# Patient Record
Sex: FEMALE | Race: WHITE | NOT HISPANIC OR LATINO | Employment: FULL TIME | URBAN - METROPOLITAN AREA
[De-identification: names, ages, dates, MRNs, and addresses within clinical notes are randomized per-mention and may not be internally consistent; named-entity substitution may affect disease eponyms.]

---

## 2024-07-12 ENCOUNTER — OFFICE VISIT (OUTPATIENT)
Dept: FAMILY MEDICINE | Facility: CLINIC | Age: 29
End: 2024-07-12
Payer: COMMERCIAL

## 2024-07-12 VITALS
SYSTOLIC BLOOD PRESSURE: 104 MMHG | BODY MASS INDEX: 23.23 KG/M2 | DIASTOLIC BLOOD PRESSURE: 72 MMHG | HEIGHT: 67 IN | WEIGHT: 148 LBS | RESPIRATION RATE: 18 BRPM | HEART RATE: 73 BPM | TEMPERATURE: 99 F | OXYGEN SATURATION: 99 %

## 2024-07-12 DIAGNOSIS — R30.0 DYSURIA: Primary | ICD-10-CM

## 2024-07-12 LAB
ALBUMIN UR-MCNC: NEGATIVE MG/DL
APPEARANCE UR: CLEAR
BILIRUB UR QL STRIP: NEGATIVE
COLOR UR AUTO: YELLOW
GLUCOSE UR STRIP-MCNC: NEGATIVE MG/DL
HGB UR QL STRIP: NEGATIVE
KETONES UR STRIP-MCNC: NEGATIVE MG/DL
LEUKOCYTE ESTERASE UR QL STRIP: NEGATIVE
NITRATE UR QL: NEGATIVE
PH UR STRIP: 7.5 [PH] (ref 5–8)
SP GR UR STRIP: 1.01 (ref 1–1.03)
UROBILINOGEN UR STRIP-ACNC: 0.2 E.U./DL

## 2024-07-12 PROCEDURE — 81003 URINALYSIS AUTO W/O SCOPE: CPT | Performed by: FAMILY MEDICINE

## 2024-07-12 PROCEDURE — 99203 OFFICE O/P NEW LOW 30 MIN: CPT | Performed by: FAMILY MEDICINE

## 2024-07-12 RX ORDER — PHENAZOPYRIDINE HYDROCHLORIDE 100 MG/1
100 TABLET, FILM COATED ORAL 3 TIMES DAILY PRN
Qty: 30 TABLET | Refills: 0 | Status: SHIPPED | OUTPATIENT
Start: 2024-07-12

## 2024-07-12 SDOH — HEALTH STABILITY: PHYSICAL HEALTH: ON AVERAGE, HOW MANY DAYS PER WEEK DO YOU ENGAGE IN MODERATE TO STRENUOUS EXERCISE (LIKE A BRISK WALK)?: 3 DAYS

## 2024-07-12 ASSESSMENT — SOCIAL DETERMINANTS OF HEALTH (SDOH): HOW OFTEN DO YOU GET TOGETHER WITH FRIENDS OR RELATIVES?: ONCE A WEEK

## 2024-07-12 ASSESSMENT — PAIN SCALES - GENERAL: PAINLEVEL: NO PAIN (0)

## 2024-07-12 NOTE — PATIENT INSTRUCTIONS
Since your breast pain is local- please keep an eye on it. Keep an eye out for: skin changes, new lumps or bumps (even nearby)

## 2024-07-12 NOTE — PROGRESS NOTES
Assessment & Plan     Dysuria  Acute, uncomplicated. Not positive for urinary tract infection despite symptoms. Recommend referral to urology, consider interstitial cystitis.   - UA reflex to Microscopic - lab collect  - UA reflex to Microscopic - lab collect  - phenazopyridine (PYRIDIUM) 100 MG tablet  Dispense: 30 tablet; Refill: 0  - Adult Urology  Referral    Patient has been advised of split billing requirements and indicates understanding: Yes        Counseling  Appropriate preventive services were addressed with this patient via screening, questionnaire, or discussion as appropriate for fall prevention, nutrition, physical activity, Tobacco-use cessation, weight loss and cognition.  Checklist reviewing preventive services available has been given to the patient.      See Patient Instructions    Subjective Rylee is a 28 year old, presenting for the following health issues:  Establish Care and UTI        7/12/2024    12:54 PM   Additional Questions   Roomed by MAURISIO Mcintyre   Accompanied by ZAIRA SILVA       Genitourinary - Female  Onset/Duration: 2 months -  Description:   Painful urination (Dysuria): YES - and several minutes after            Frequency: YES- and some urgency.   Blood in urine (Hematuria): No  Delay in urine (Hesitency): No  Intensity: moderate  Progression of Symptoms:  worsening  Accompanying Signs & Symptoms:  Fever/chills: YES  Flank pain: YES  Nausea and vomiting: No  Vaginal symptoms: none  Abdominal/Pelvic Pain: YES  History:   History of frequent UTI s: YES- once every couple years  History of kidney stones: No  Sexually Active: YES  Possibility of pregnancy: No  Precipitating or alleviating factors: dehydration makes it worse.   Therapies tried and outcome: Increase fluid intake    Initially thought it was a yeast infection, treated with monistat and it had mild relief. But it returned. Patient has right sided back pain but unable to discern if related to UTI. No abnormal  "vaginal discharge. She is having some pelvic brim.       Review of Systems  Constitutional, HEENT, cardiovascular, pulmonary, gi and gu systems are negative, except as otherwise noted.      Objective    /72 (BP Location: Right arm, Patient Position: Sitting, Cuff Size: Adult Regular)   Pulse 73   Temp 99  F (37.2  C) (Oral)   Resp 18   Ht 1.709 m (5' 7.3\")   Wt 67.1 kg (148 lb)   LMP 06/21/2024 (Approximate)   SpO2 99%   BMI 22.97 kg/m    Body mass index is 22.97 kg/m .  Physical Exam   GENERAL: alert and no distress  EYES: Eyes grossly normal to inspection, PERRL and conjunctivae and sclerae normal  NECK: no adenopathy, no asymmetry, masses, or scars  MS: no gross musculoskeletal defects noted, no edema  SKIN: no suspicious lesions or rashes  PSYCH: mentation appears normal, affect normal/bright    Results for orders placed or performed in visit on 07/12/24   UA reflex to Microscopic - lab collect     Status: Normal   Result Value Ref Range    Color Urine Yellow Colorless, Straw, Light Yellow, Yellow    Appearance Urine Clear Clear    Glucose Urine Negative Negative mg/dL    Bilirubin Urine Negative Negative    Ketones Urine Negative Negative mg/dL    Specific Gravity Urine 1.015 1.005 - 1.030    Blood Urine Negative Negative    pH Urine 7.5 5.0 - 8.0    Protein Albumin Urine Negative Negative mg/dL    Urobilinogen Urine 0.2 0.2, 1.0 E.U./dL    Nitrite Urine Negative Negative    Leukocyte Esterase Urine Negative Negative    Narrative    Microscopic not indicated           Signed Electronically by: AYAN IBARRA DO    "

## 2024-07-12 NOTE — PROGRESS NOTES
Preventive Care Visit  [facility]  AYANANTOLIN IBARRA DO, Family Medicine  Jul 12, 2024  {Provider  Link to [facility] :399839}    {PROVIDER CHARTING PREFERENCE:041814}    Subjective Rylee is a 28 year old, presenting for the following:  Physical        7/12/2024    12:54 PM   Additional Questions   Roomed by MAURISIO Mcintyre   Accompanied by ZAIRA        Health Care Directive  Patient does not have a Health Care Directive or Living Will: Discussed advance care planning with patient; however, patient declined at this time.    HPI  ***  {MA/LPN/RN Pre-Provider Visit Orders- hCG/UA/Strep (Optional):930793}  {SUPERLIST (Optional):319169}  {additonal problems for provider to add (Optional):854452}      7/12/2024   General Health   How would you rate your overall physical health? Good   Feel stress (tense, anxious, or unable to sleep) To some extent      (!) STRESS CONCERN      7/12/2024   Nutrition   Three or more servings of calcium each day? (!) NO   Diet: Breakfast skipped   How many servings of fruit and vegetables per day? (!) 2-3   How many sweetened beverages each day? 0-1            7/12/2024   Exercise   Days per week of moderate/strenous exercise 3 days            7/12/2024   Social Factors   Frequency of gathering with friends or relatives Once a week   Worry food won't last until get money to buy more No   Food not last or not have enough money for food? No   Do you have housing? (Housing is defined as stable permanent housing and does not include staying ouside in a car, in a tent, in an abandoned building, in an overnight shelter, or couch-surfing.) Yes   Are you worried about losing your housing? No   Lack of transportation? No   Unable to get utilities (heat,electricity)? No            7/12/2024   Dental   Dentist two times every year? Yes            7/12/2024   TB Screening   Were you born outside of the [place]? No            Today's PHQ-2 Score:       7/12/2024    12:55 PM   PHQ-2  ( 1999 [employer])   Q1: Little interest or pleasure in doing things 0   Q2: Feeling down, depressed or hopeless 0   PHQ-2 Score 0   Q1: Little interest or pleasure in doing things Not at all   Q2: Feeling down, depressed or hopeless Not at all   PHQ-2 Score 0           7/12/2024   Substance Use   Alcohol more than 3/day or more than 7/wk Yes   How often do you have a drink containing alcohol 2 to 3 times a week   How many alcohol drinks on typical day 3 or 4   How often do you have 5+ drinks at one occasion Monthly   Audit 2/3 Score 3   How often not able to stop drinking once started Less than monthly   How often failed to do what normally expected Never   How often needed first drink in am after a heavy drinking session Never   How often feeling of guilt or remorse after drinking Less than monthly   How often unable to remember what happened the night before Less than monthly   Have you or someone else been injured because of your drinking No   Has anyone been concerned or suggested you cut down on drinking No   TOTAL SCORE - AUDIT 9   Do you use any other substances recreationally? (!) ALCOHOL        Social History     Tobacco Use    Smoking status: Never    Smokeless tobacco: Current    Tobacco comments:     Nicotine Lozenges.    Vaping Use    Vaping status: Never Used     {Provider  If there are gaps in the social history shown above, please follow the link to update and then refresh the note Link to Social and Substance History :911262}     {Mammogram Decision Support (Optional):060835}          7/12/2024   One time HIV Screening   Previous HIV test? No          7/12/2024   STI Screening   New sexual partner(s) since last STI/HIV test? (!) YES ***        History of abnormal Pap smear: { :760912}             7/12/2024   Contraception/Family Planning   Questions about contraception or family planning No        {Provider  REQUIRED FOR AWV Use the storyboard to review patient history, after sections have been  "marked as reviewed, refresh note to capture documentation:107078}   Reviewed and updated as needed this visit by Provider                    {HISTORY OPTIONS (Optional):263005}    {ROS Picklists (Optional):027763}     Objective    Exam  /72 (BP Location: Right arm, Patient Position: Sitting, Cuff Size: Adult Regular)   Pulse 73   Temp 99  F (37.2  C) (Oral)   Resp 18   Ht 1.709 m (5' 7.3\")   Wt 67.1 kg (148 lb)   LMP 06/21/2024 (Approximate)   SpO2 99%   BMI 22.97 kg/m     Estimated body mass index is 22.97 kg/m  as calculated from the following:    Height as of this encounter: 1.709 m (5' 7.3\").    Weight as of this encounter: 67.1 kg (148 lb).    Physical Exam  {Exam Choices (Optional):707643}        Signed Electronically by: AYAN IABRRA DO  {Email feedback regarding this note to [email or web address]   :701819}  "

## 2024-07-12 NOTE — RESULT ENCOUNTER NOTE
Results discussed directly with patient while patient was present. Any further details documented in the note.   AYAN IBARRA, DO

## 2024-07-29 ENCOUNTER — OFFICE VISIT (OUTPATIENT)
Dept: UROLOGY | Facility: CLINIC | Age: 29
End: 2024-07-29
Attending: FAMILY MEDICINE
Payer: COMMERCIAL

## 2024-07-29 VITALS
WEIGHT: 148 LBS | HEIGHT: 67 IN | OXYGEN SATURATION: 97 % | TEMPERATURE: 97.8 F | BODY MASS INDEX: 23.23 KG/M2 | DIASTOLIC BLOOD PRESSURE: 75 MMHG | SYSTOLIC BLOOD PRESSURE: 110 MMHG

## 2024-07-29 DIAGNOSIS — R30.0 DYSURIA: ICD-10-CM

## 2024-07-29 LAB
ALBUMIN UR-MCNC: NEGATIVE MG/DL
APPEARANCE UR: CLEAR
BILIRUB UR QL STRIP: NEGATIVE
COLOR UR AUTO: YELLOW
GLUCOSE UR STRIP-MCNC: NEGATIVE MG/DL
HGB UR QL STRIP: NEGATIVE
KETONES UR STRIP-MCNC: NEGATIVE MG/DL
LEUKOCYTE ESTERASE UR QL STRIP: NEGATIVE
NITRATE UR QL: NEGATIVE
PH UR STRIP: 6 [PH] (ref 5–7)
RBC #/AREA URNS AUTO: NORMAL /HPF
SP GR UR STRIP: 1.02 (ref 1–1.03)
UROBILINOGEN UR STRIP-ACNC: 0.2 E.U./DL
WBC #/AREA URNS AUTO: NORMAL /HPF

## 2024-07-29 PROCEDURE — 87563 M. GENITALIUM AMP PROBE: CPT | Mod: 90 | Performed by: STUDENT IN AN ORGANIZED HEALTH CARE EDUCATION/TRAINING PROGRAM

## 2024-07-29 PROCEDURE — 99000 SPECIMEN HANDLING OFFICE-LAB: CPT | Performed by: STUDENT IN AN ORGANIZED HEALTH CARE EDUCATION/TRAINING PROGRAM

## 2024-07-29 PROCEDURE — 87798 DETECT AGENT NOS DNA AMP: CPT | Mod: 90 | Performed by: STUDENT IN AN ORGANIZED HEALTH CARE EDUCATION/TRAINING PROGRAM

## 2024-07-29 PROCEDURE — 99203 OFFICE O/P NEW LOW 30 MIN: CPT | Performed by: STUDENT IN AN ORGANIZED HEALTH CARE EDUCATION/TRAINING PROGRAM

## 2024-07-29 PROCEDURE — 81001 URINALYSIS AUTO W/SCOPE: CPT | Performed by: STUDENT IN AN ORGANIZED HEALTH CARE EDUCATION/TRAINING PROGRAM

## 2024-07-29 ASSESSMENT — PAIN SCALES - GENERAL: PAINLEVEL: NO PAIN (0)

## 2024-07-29 NOTE — PATIENT INSTRUCTIONS
"BLADDER IRRITANTS    Below is a list of foods that can irritate the bladder.     Caffeinated soft drinks  Coffee.  Tea.  Chocolate.  Acidic juices and fruits such as cranberry, lemon, orange, pineapple and other citrus as well as tart apple or cherry   Alcoholic beverages  Carbonated drinks, especially phoenix  Aspartame/Nutrasweet.  Spicy foods  Tomato products    Substitutions that you can make in your daily diet:  - Herbals teas that don't contain caffeine or a large amount of citrus  - Ovaltine instead of chocolate drinks  - Less acidic fruits such as blueberry, banana, sweet (rather than tart) apples, pears, papaya.      Either:  - Consider limiting or avoiding these foods then monitoring the impact on your urinary symptoms   - Or consider an \"elimination diet.\"  Start by eliminating all these foods for 2 weeks.  Assess for progress.  If you are feeling better, slowly add foods back - 1 new food every 3-5 days and assess for worsening.  This way you can pinpoint exactly which foods worsen your bladder symptoms.       "

## 2024-07-29 NOTE — PROGRESS NOTES
"        Chief Complaint:   LUTS         History of Present Illness:   Rylee P Caruso is a 28 year old female with a history of chronic cluster headaches, tobacco use disorder, GERD, major depressive disorder, and PATRICIA who presents for evaluation of LUTS.    The patient reports a several month history of intermittent dysuria. She notices the dysuria more when she is at work. She has a labor intensive job. She reports a slightly stronger urge, but this is not bothersome. She denies sensation of incomplete bladder emptying, nocturia, and gross hematuria.     She reports some constipation more recently, but can fluctuate between diarrhea and constipation.    She denies a history of urologic procedures. She had congenital VUR.     She a large mug of coffee, water, and occasional alcohol. She also drinks sparkling water.           Past Medical History:   No past medical history on file.         Past Surgical History:   No past surgical history on file.         Medications     Current Outpatient Medications   Medication Sig Dispense Refill    phenazopyridine (PYRIDIUM) 100 MG tablet Take 1 tablet (100 mg) by mouth 3 times daily as needed for urinary tract discomfort (Patient not taking: Reported on 7/29/2024) 30 tablet 0     No current facility-administered medications for this visit.            Allergies:   Patient has no known allergies.         Review of Systems:  From intake questionnaire   Negative 14 system review except as noted on HPI, nurse's note.         Physical Exam:   Patient is a 28 year old  female   Vitals: Blood pressure 110/75, temperature 97.8  F (36.6  C), temperature source Tympanic, height 1.709 m (5' 7.3\"), weight 67.1 kg (148 lb), last menstrual period 06/21/2024, SpO2 97%.  General Appearance Adult: Alert, no acute distress, oriented.  Lungs: Non-labored breathing.  Heart: No obvious jugular venous distension present.  Neuro: Alert, oriented, speech and mentation normal        Labs and Pathology:  "   I personally reviewed all applicable laboratory data and went over findings with patient  Significant for:    UA RESULTS:   Recent Labs   Lab Test 07/12/24  1335   SG 1.015   URINEPH 7.5   NITRITE Negative              Assessment and Plan:     Assessment: 28 year old female seen in evaluation for intermittent dysuria. Her symptoms are worse at work. She has a labor intensive job.     UAs have been unremarkable. Renal US in February 2024 was unremarkable as well.     We discussed possible causes for her symptoms including infection, consumption of bladder irritants, pelvic floor dysfunction, and less likely kidney stones. We will repeat UA today and test for mycoplasma/ureaplasma infection as well. We discussed pelvic floor PT. Her symptoms are not particularly bothersome at this time, so we will hold off on a referral for now.     Plan:  UA and mycoplasma/ureaplasma testing today.   Reduce bladder irritants.   Follow up with urology as needed.     DIANELYS BASS PA-C  Department of Urology

## 2024-07-31 LAB
M GENITALIUM DNA SPEC QL NAA+PROBE: NOT DETECTED
M HOMINIS DNA SPEC QL NAA+PROBE: NOT DETECTED
U PARVUM DNA SPEC QL NAA+PROBE: NOT DETECTED
U UREALYTICUM DNA SPEC QL NAA+PROBE: NOT DETECTED

## 2024-10-06 ENCOUNTER — HEALTH MAINTENANCE LETTER (OUTPATIENT)
Age: 29
End: 2024-10-06

## 2024-12-30 ENCOUNTER — VIRTUAL VISIT (OUTPATIENT)
Dept: FAMILY MEDICINE | Facility: CLINIC | Age: 29
End: 2024-12-30
Payer: COMMERCIAL

## 2024-12-30 ENCOUNTER — ALLIED HEALTH/NURSE VISIT (OUTPATIENT)
Dept: FAMILY MEDICINE | Facility: CLINIC | Age: 29
End: 2024-12-30
Payer: COMMERCIAL

## 2024-12-30 VITALS — OXYGEN SATURATION: 99 % | DIASTOLIC BLOOD PRESSURE: 70 MMHG | SYSTOLIC BLOOD PRESSURE: 110 MMHG | HEART RATE: 106 BPM

## 2024-12-30 DIAGNOSIS — G44.009 CLUSTER HEADACHE, NOT INTRACTABLE, UNSPECIFIED CHRONICITY PATTERN: Primary | ICD-10-CM

## 2024-12-30 DIAGNOSIS — I10 ENCOUNTER FOR FOLLOW-UP FOR HYPERTENSION: Primary | ICD-10-CM

## 2024-12-30 PROBLEM — F32.1 CURRENT MODERATE EPISODE OF MAJOR DEPRESSIVE DISORDER WITHOUT PRIOR EPISODE (H): Status: ACTIVE | Noted: 2023-07-10

## 2024-12-30 PROBLEM — G43.109 MIGRAINE WITH AURA: Status: ACTIVE | Noted: 2020-09-11

## 2024-12-30 PROBLEM — K58.9 IRRITABLE BOWEL SYNDROME: Status: ACTIVE | Noted: 2023-07-10

## 2024-12-30 PROBLEM — G44.029 CHRONIC CLUSTER HEADACHE, NOT INTRACTABLE: Status: ACTIVE | Noted: 2024-01-17

## 2024-12-30 PROBLEM — Z80.0 FAMILY HISTORY OF COLON CANCER: Status: ACTIVE | Noted: 2023-07-10

## 2024-12-30 PROBLEM — F41.1 GAD (GENERALIZED ANXIETY DISORDER): Status: ACTIVE | Noted: 2023-07-10

## 2024-12-30 PROBLEM — K21.9 GASTROESOPHAGEAL REFLUX DISEASE: Status: ACTIVE | Noted: 2023-07-10

## 2024-12-30 PROCEDURE — 99213 OFFICE O/P EST LOW 20 MIN: CPT | Mod: 95 | Performed by: NURSE PRACTITIONER

## 2024-12-30 PROCEDURE — 99207 PR NO CHARGE NURSE ONLY: CPT

## 2024-12-30 RX ORDER — CEFUROXIME AXETIL 250 MG/1
6 TABLET ORAL
Qty: 2 ML | Refills: 1 | Status: SHIPPED | OUTPATIENT
Start: 2024-12-30

## 2024-12-30 NOTE — PROGRESS NOTES
Rylee is a 29 year old who is being evaluated via a billable video visit.    How would you like to obtain your AVS? [employer]  If the video visit is dropped, the invitation should be resent by: Text to cell phone: [phone]  Will anyone else be joining your video visit? No      If patient has telephone visit, have they been educated on video visit as preferred visit method and offered to change to video visit? NOT APPLICABLE        Instructions Relayed to Patient by Virtual Roomer:         Patient Confirmed they will join visit via: Text Link to Cell Phone  Reminded patient to ensure they were logged on to virtual visit by arrival time listed.   Asked if patient has flexibility to initiate visit sooner than arrival time: patient stated yes, documented in appointment notes availability to initiate visit earlier than arrival time     If pediatric virtual visit, ensured pediatric patient along with parent/guardian will be present for video visit.     Patient offered the website [email or web address] and/or phone number to [employer] Help line: [phone]   Assessment & Plan     Cluster headache, not intractable, unspecified chronicity pattern  I have asked for records from her Neurologist. She reports having had an unremarkable brain MRI  10/12/21 through [facility] neurological clinic. She did have slight ethmoid paranasal sinus inflammatory changes. (Report available through [employer]/jil. Once records are reviewed, will consider writing referral for O2.  She was also advised to follow back with [facility] for ongoing headache management.              Regular exercise  See Patient Instructions    Subjective   Rylee is a 29 year old, presenting for the following health issues:  Mirgrane  and Medication check       12/30/2024     1:02 PM   Additional Questions   Roomed by Ranjana   Accompanied by self         12/30/2024     1:02 PM   Patient Reported Additional Medications   Patient reports taking the  following new medications No     History of Present Illness       Reason for visit:  Migraine, medication check   She is taking medications regularly.       Migraine /cluster headaches  Rylee reports history of cluster headaches diagnosed about 2 years ago with Neurology (thinks she saw Dr. Luis Enrique Santillan Neurology) after having had headaches off and on since age 15.  She has tried multiple po and IM tryptans without improvement- the only one that helped some is Imitrex and she requests a refill of this today.  It does cause her to feel hung over in the am, however. She has been prescribed supplemental O2 for when she gets a cluster headache (right sided around her eye) but never started it and requests order for it today.  We have no records and I have asked that she have her Neurologist sen me her records. She reports having cluster headache at least annually and symptoms will occur 3-7 times a week and last up to a couple of months at a time.  She has also been on Lithuim and Topamax neither of which helped with the headaches.  PMH significant for history of major depression and PATRICIA as well.    She works irregular [employer] as a  and also as a window , reports going to medication between 1- an dsleeping until 0700. 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     Since your last clinic visit, how have your headaches changed?  No change  How often are you getting headaches or migraines? Annually, reports history of cluster headaches   Are you able to do normal daily activities when you have a migraine? No- has missed work due to headaches  Are you taking rescue/relief medications? (Select all that apply) sumatriptan (Imitrex)  How helpful is your rescue/relief medication?  I get some relief  Are you taking any medications to prevent migraines? (Select all that apply)  No  In the past 4 weeks, how often have  you gone to urgent care or the emergency room because of your headaches?  0  How many servings of fruits and vegetables do you eat daily?  2-3  On average, how many sweetened beverages do you drink each day (Examples: soda, juice, sweet tea, etc.  Do NOT count diet or artificially sweetened beverages)?   0  How many days per week do you exercise enough to make your heart beat faster? 4  How many minutes a day do you exercise enough to make your heart beat faster? 20 - 29  How many days per week do you miss taking your medication? 0    Depression- she is not now taking anything for depression or anxiety, does not want to address this today, has appt next form for physical and we'll address this then.    Review of Systems  Constitutional, HEENT, cardiovascular, pulmonary, gi and gu systems are negative, except as otherwise noted.      Objective           Vitals:  No vitals were obtained today due to virtual visit.    Physical Exam   GENERAL: alert and no distress  EYES: Eyes grossly normal to inspection.  No discharge or erythema, or obvious scleral/conjunctival abnormalities.  HENT: Normal cephalic/atraumatic.  External ears, nose and mouth without ulcers or lesions.  No nasal drainage visible.  NECK: No asymmetry, visible masses or scars  RESP: No audible wheeze, cough, or visible cyanosis.    SKIN: Visible skin clear. No significant rash, abnormal pigmentation or lesions.  NEURO: Cranial nerves grossly intact.  Mentation and speech appropriate for age.  PSYCH: Appropriate affect, tone, and pace of words        Video-Visit Details    Type of service:  Video Visit   Originating Location (pt. Location): Home    Distant Location (provider location):  On-site  Platform used for Video Visit: Ankit  Signed Electronically by: SERINA Schwartz CNP

## 2024-12-30 NOTE — PROGRESS NOTES
Rylee P Caruso is a 29 year old patient who comes in today for a Blood Pressure check.  Initial BP:  /70 (BP Location: Right arm, Patient Position: Sitting, Cuff Size: Adult Regular)   Pulse 106   LMP 12/09/2024 (Approximate)   SpO2 99%      106  Disposition: follow-up as previously indicated by provider and results routed to provider

## 2024-12-31 ENCOUNTER — MYC MEDICAL ADVICE (OUTPATIENT)
Dept: FAMILY MEDICINE | Facility: CLINIC | Age: 29
End: 2024-12-31
Payer: COMMERCIAL

## 2025-01-06 ENCOUNTER — PATIENT OUTREACH (OUTPATIENT)
Dept: CARE COORDINATION | Facility: CLINIC | Age: 30
End: 2025-01-06
Payer: COMMERCIAL

## 2025-01-08 NOTE — TELEPHONE ENCOUNTER
Received records, visit note from 10/6/2021 and MRI Brain W/O/mail from 10/11/2021 from [facility]. Placed in Halima Cohn's box.  Annie Maddox [facility]   Primary Care

## 2025-02-03 ENCOUNTER — OFFICE VISIT (OUTPATIENT)
Dept: FAMILY MEDICINE | Facility: CLINIC | Age: 30
End: 2025-02-03
Payer: COMMERCIAL

## 2025-02-03 VITALS
WEIGHT: 150.8 LBS | SYSTOLIC BLOOD PRESSURE: 115 MMHG | HEIGHT: 67 IN | HEART RATE: 86 BPM | TEMPERATURE: 98.4 F | OXYGEN SATURATION: 100 % | DIASTOLIC BLOOD PRESSURE: 77 MMHG | RESPIRATION RATE: 16 BRPM | BODY MASS INDEX: 23.67 KG/M2

## 2025-02-03 DIAGNOSIS — Z00.00 ROUTINE HISTORY AND PHYSICAL EXAMINATION OF ADULT: Primary | ICD-10-CM

## 2025-02-03 DIAGNOSIS — F41.1 GAD (GENERALIZED ANXIETY DISORDER): ICD-10-CM

## 2025-02-03 DIAGNOSIS — Z28.20 VACCINE REFUSED BY PATIENT: ICD-10-CM

## 2025-02-03 DIAGNOSIS — N64.4 BREAST PAIN, RIGHT: ICD-10-CM

## 2025-02-03 DIAGNOSIS — F32.1 CURRENT MODERATE EPISODE OF MAJOR DEPRESSIVE DISORDER WITHOUT PRIOR EPISODE (H): ICD-10-CM

## 2025-02-03 DIAGNOSIS — G44.029 CHRONIC CLUSTER HEADACHE, NOT INTRACTABLE: ICD-10-CM

## 2025-02-03 PROBLEM — G44.019 EPISODIC CLUSTER HEADACHE, NOT INTRACTABLE: Status: ACTIVE | Noted: 2023-11-07

## 2025-02-03 PROCEDURE — 99214 OFFICE O/P EST MOD 30 MIN: CPT | Performed by: NURSE PRACTITIONER

## 2025-02-03 PROCEDURE — 96127 BRIEF EMOTIONAL/BEHAV ASSMT: CPT | Performed by: NURSE PRACTITIONER

## 2025-02-03 PROCEDURE — 99395 PREV VISIT EST AGE 18-39: CPT | Mod: 25 | Performed by: NURSE PRACTITIONER

## 2025-02-03 PROCEDURE — 3074F SYST BP LT 130 MM HG: CPT | Performed by: NURSE PRACTITIONER

## 2025-02-03 PROCEDURE — G2211 COMPLEX E/M VISIT ADD ON: HCPCS | Performed by: NURSE PRACTITIONER

## 2025-02-03 PROCEDURE — 3078F DIAST BP <80 MM HG: CPT | Performed by: NURSE PRACTITIONER

## 2025-02-03 SDOH — HEALTH STABILITY: PHYSICAL HEALTH: ON AVERAGE, HOW MANY DAYS PER WEEK DO YOU ENGAGE IN MODERATE TO STRENUOUS EXERCISE (LIKE A BRISK WALK)?: 1 DAY

## 2025-02-03 ASSESSMENT — SOCIAL DETERMINANTS OF HEALTH (SDOH): HOW OFTEN DO YOU GET TOGETHER WITH FRIENDS OR RELATIVES?: ONCE A WEEK

## 2025-02-03 ASSESSMENT — PATIENT HEALTH QUESTIONNAIRE - PHQ9: SUM OF ALL RESPONSES TO PHQ QUESTIONS 1-9: 3

## 2025-02-03 NOTE — PROGRESS NOTES
Preventive Care Visit  [facility]  SERINA Schwartz CNP, Family Medicine  Feb 3, 2025      Assessment & Plan     Routine history and physical examination of adult  Appropriate preventive services were addressed with this patient via screening, questionnaire, or discussion as appropriate for fall prevention, nutrition, physical activity, tobacco-use cessation, social engagement, weight loss and cognition.  Checklist reviewing preventive services available has been given to the patient.      - BASIC METABOLIC PANEL  - REVIEW OF HEALTH MAINTENANCE PROTOCOL ORDERS  - PRIMARY CARE FOLLOW-UP SCHEDULING    PATRICIA (generalized anxiety disorder)  Checking TSH, not on selective serotonin reuptake inhibitor and declines medication for it for now.  - TSH with free T4 reflex    Current moderate episode of major depressive disorder without prior episode (H)  Is open to counseling to help with PATRICIA and Depression, referral placed.  - Adult Mental Health  Referral    Breast pain, right  Getting US.mammo as indicated  - US Breast Right Limited 1-3 Quadrants  - MA Screen Right w/Abiel    Chronic cluster headache, not intractable  STable at present, continue with prn Imitraex, keep headache log and bring to next visit, follow back 6 monnths, sooner if not improved     Vaccine refused by patient  She declined COVID, and Flu vaccines          Counseling  Appropriate preventive services were addressed with this patient via screening, questionnaire, or discussion as appropriate for fall prevention, nutrition, physical activity, Tobacco-use cessation, social engagement, weight loss and cognition.  Checklist reviewing preventive services available has been given to the patient.      Work on weight loss  Regular exercise  See Patient Instructions    Subjective Rylee is a 29 year old, presenting for the following:  Physical        2/3/2025     3:13 PM   Additional Questions   Roomed by elias  "  Accompanied by self         2/3/2025     3:13 PM   Patient Reported Additional Medications   Patient reports taking the following new medications no          HPI    Has lead exposure from her work, most recent lead level was 6 per patient,followed by her Occupational medicine provider through her work.    Right breast pain just above right nipple    Depression and Anxiety   How are you doing with your depression since your last visit? No change  How are you doing with your anxiety since your last visit?  No change  Are you having other symptoms that might be associated with depression or anxiety? Yes:  Feels as if she has \"a lot of outward energy and then will crash and burn for 2-3 days \" for the last 5 years occurring every 1-2 months but now has symptoms a couple of times a year.  When she crashes she admits to feeling agorophobic and does not leave the house for 2-3 days. MGF had bipolar.  Have you had a significant life event? No   Do you have any concerns with your use of alcohol or other drugs? No    Social History     Tobacco Use    Smoking status: Never    Smokeless tobacco: Current    Tobacco comments:     Nicotine Lozenges.    Vaping Use    Vaping status: Never Used         2/3/2025     3:15 PM   PHQ   PHQ-9 Total Score 3   Q9: Thoughts of better off dead/self-harm past 2 weeks Not at all          No data to display                  Migraine   Since your last clinic visit, how have your headaches changed?  Improved  How often are you getting headaches or migraines? A couple of time a a year   Are you able to do normal daily activities when you have a migraine? No  Are you taking rescue/relief medications? (Select all that apply) ibuprofen (Advil, Motrin), Excedrin, and sumatriptan (Imitrex)  How helpful is your rescue/relief medication?  I get some relief  Are you taking any medications to prevent migraines? (Select all that apply)  No  In the past 4 weeks, how often have you gone to urgent care or the " emergency room because of your headaches?  0    Health Care Directive  Patient does not have a Health Care Directive: Discussed advance care planning with patient; information given to patient to review.      2/3/2025   General Health   How would you rate your overall physical health? Good   Feel stress (tense, anxious, or unable to sleep) To some extent   (!) STRESS CONCERN      2/3/2025   Nutrition   Three or more servings of calcium each day? (!) NO   Diet: Regular (no restrictions)   How many servings of fruit and vegetables per day? (!) 2-3   How many sweetened beverages each day? 0-1         2/3/2025   Exercise   Days per week of moderate/strenous exercise 1 day   (!) EXERCISE CONCERN      2/3/2025   Social Factors   Frequency of gathering with friends or relatives Once a week   Worry food won't last until get money to buy more No   Food not last or not have enough money for food? No   Do you have housing? (Housing is defined as stable permanent housing and does not include staying ouside in a car, in a tent, in an abandoned building, in an overnight shelter, or couch-surfing.) Yes   Are you worried about losing your housing? No   Lack of transportation? No   Unable to get utilities (heat,electricity)? No         2/3/2025   Dental   Dentist two times every year? Yes         7/12/2024   TB Screening   Were you born outside of the [place]? No         Today's PHQ-2 Score:       2/3/2025     3:00 PM   PHQ-2 ( 1999 [employer])   Q1: Little interest or pleasure in doing things 0   Q2: Feeling down, depressed or hopeless 0   PHQ-2 Score 0    Q1: Little interest or pleasure in doing things Not at all   Q2: Feeling down, depressed or hopeless Not at all   PHQ-2 Score 0       Patient-reported           2/3/2025   Substance Use   Alcohol more than 3/day or more than 7/wk Yes   How often do you have a drink containing alcohol 2 to 3 times a week   How many alcohol drinks on typical day 3 or 4   How often do you have 5+ drinks at  "one occasion Monthly   Audit 2/3 Score 3   How often not able to stop drinking once started Less than monthly   How often failed to do what normally expected Less than monthly   How often needed first drink in am after a heavy drinking session Never   How often feeling of guilt or remorse after drinking Less than monthly   How often unable to remember what happened the night before Never   Have you or someone else been injured because of your drinking No   Has anyone been concerned or suggested you cut down on drinking No   TOTAL SCORE - AUDIT 9   Do you use any other substances recreationally? No     Social History     Tobacco Use    Smoking status: Never    Smokeless tobacco: Current    Tobacco comments:     Nicotine Lozenges.    Vaping Use    Vaping status: Never Used          Mammogram Screening - Patient under 40 years of age: Routine Mammogram Screening not recommended.         2/3/2025   STI Screening   New sexual partner(s) since last STI/HIV test? No     History of abnormal Pap smear: No - age 21-29 PAP every 3 years recommended             2/3/2025   Contraception/Family Planning   Questions about contraception or family planning No        Reviewed and updated as needed this visit by Provider                    No past medical history on file.  No past surgical history on file.      Review of Systems  Constitutional, HEENT, cardiovascular, pulmonary, gi and gu systems are negative, except as otherwise noted.     Objective    Exam  /77 (BP Location: Left arm, Patient Position: Sitting, Cuff Size: Adult Regular)   Pulse 86   Temp 98.4  F (36.9  C) (Oral)   Resp 16   Ht 1.702 m (5' 7\")   Wt 68.4 kg (150 lb 12.8 oz)   LMP 01/15/2025 (Approximate)   SpO2 100%   BMI 23.62 kg/m     Estimated body mass index is 23.62 kg/m  as calculated from the following:    Height as of this encounter: 1.702 m (5' 7\").    Weight as of this encounter: 68.4 kg (150 lb 12.8 oz).    Physical Exam  GENERAL: alert and no " distress  EYES: Eyes grossly normal to inspection, PERRL and conjunctivae and sclerae normal  HENT: ear canals and TM's normal, nose and mouth without ulcers or lesions  NECK: no adenopathy, no asymmetry, masses, or scars  RESP: lungs clear to auscultation - no rales, rhonchi or wheezes  CV: regular rate and rhythm, normal S1 S2, no S3 or S4, no murmur, click or rub, no peripheral edema  ABDOMEN: soft, nontender, no hepatosplenomegaly, no masses and bowel sounds normal  MS: no gross musculoskeletal defects noted, no edema  SKIN: no suspicious lesions or rashes  NEURO: Normal strength and tone, mentation intact and speech normal  BACK: no CVA tenderness, no paralumbar tenderness  PSYCH: mentation appears normal, affect normal/bright  LYMPH: normal ant/post cervical, supraclavicular nodes  inguinal: no adenopathy        Signed Electronically by: SERINA Schwartz CNP

## 2025-02-03 NOTE — PATIENT INSTRUCTIONS
At [facility], we strive to deliver an exceptional experience to you, every time we see you. If you receive a survey, please let us know what we are doing well and/or what we could improve upon, as we do value your feedback.  If you have MyChart, you can expect to receive results automatically within 24 hours of their completion.  Your provider will send a note interpreting your results as well.   If you do not have MyChart, you should receive your results in about a week by mail.    Your care team:                            Family Medicine Internal Medicine   MD Butch Monge, MD Tracy Gaona, MD Tony Rodriguez, MD Alice Narayanan, PAAncaC    Clark Lepe, MD Pediatrics   Jania Palomo, MD Shira Tracey, MD Halima Cohn, APRN CNP Kimberly Scott APRN CNP   Ladan Brooke, MD Gissel Guardado, MD Lupe Lozano, CNP     Cayetano Puente, CNP Same-Day Provider (No follow-up visits)   SERINA Mariano, DNP Dona Myers, PA-C   SERINA Welch, FNP, BC SONU McconnellC     Clinic hours: Monday - Thursday 7 am-6 pm; Fridays 7 am-5 pm.   Urgent care: Monday - Friday 10 am- 8 pm; Saturday and Sunday 9 am-5 pm.    Clinic: [phone]       [facility]: Monday - Thursday 8 am - 7 pm; Friday 8 am - 6 pm  [facility]: [phone] '  Patient Education   Preventive Care Advice   This is general advice given by our system to help you stay healthy. However, your care team may have specific advice just for you. Please talk to your care team about your preventive care needs.  Nutrition  Eat 5 or more servings of fruits and vegetables each day.  Try wheat bread, brown rice and whole grain pasta (instead of white bread, rice, and pasta).  Get enough calcium and vitamin D. Check the label on foods and aim for 100% of the RDA (recommended daily allowance).  Lifestyle  Exercise at least 150  minutes each week  (30 minutes a day, 5 days a week).  Do muscle strengthening activities 2 days a week. These help control your weight and prevent disease.  No smoking.  Wear sunscreen to prevent skin cancer.  Have a dental exam and cleaning every 6 months.  Yearly exams  See your health care team every year to talk about:  Any changes in your health.  Any medicines your care team has prescribed.  Preventive care, family planning, and ways to prevent chronic diseases.  Shots (vaccines)   HPV shots (up to age 26), if you've never had them before.  Hepatitis B shots (up to age 59), if you've never had them before.  COVID-19 shot: Get this shot when it's due.  Flu shot: Get a flu shot every year.  Tetanus shot: Get a tetanus shot every 10 years.  Pneumococcal, hepatitis A, and RSV shots: Ask your care team if you need these based on your risk.  Shingles shot (for age 50 and up)  General health tests  Diabetes screening:  Starting at age 35, Get screened for diabetes at least every 3 years.  If you are younger than age 35, ask your care team if you should be screened for diabetes.  Cholesterol test: At age 39, start having a cholesterol test every 5 years, or more often if advised.  Bone density scan (DEXA): At age 50, ask your care team if you should have this scan for osteoporosis (brittle bones).  Hepatitis C: Get tested at least once in your life.  STIs (sexually transmitted infections)  Before age 24: Ask your care team if you should be screened for STIs.  After age 24: Get screened for STIs if you're at risk. You are at risk for STIs (including HIV) if:  You are sexually active with more than one person.  You don't use condoms every time.  You or a partner was diagnosed with a sexually transmitted infection.  If you are at risk for HIV, ask about PrEP medicine to prevent HIV.  Get tested for HIV at least once in your life, whether you are at risk for HIV or not.  Cancer screening tests  Cervical cancer screening:  If you have a cervix, begin getting regular cervical cancer screening tests starting at age 21.  Breast cancer scan (mammogram): If you've ever had breasts, begin having regular mammograms starting at age 40. This is a scan to check for breast cancer.  Colon cancer screening: It is important to start screening for colon cancer at age 45.  Have a colonoscopy test every 10 years (or more often if you're at risk) Or, ask your provider about stool tests like a FIT test every year or Cologuard test every 3 years.  To learn more about your testing options, visit:   .  For help making a decision, visit:   https://bit.ly/yn19418.  Prostate cancer screening test: If you have a prostate, ask your care team if a prostate cancer screening test (PSA) at age 55 is right for you.  Lung cancer screening: If you are a current or former smoker ages 50 to 80, ask your care team if ongoing lung cancer screenings are right for you.  For informational purposes only. Not to replace the advice of your health care provider. Copyright   2023 [facility] [employer]. All rights reserved. Clinically reviewed by the [facility] Transitions Program. [employer] [id number] - REV 01/24.  Learning About Stress  What is stress?     Stress is your body's response to a hard situation. Your body can have a physical, emotional, or mental response. Stress is a fact of life for most people, and it affects everyone differently. What causes stress for you may not be stressful for someone else.  A lot of things can cause stress. You may feel stress when you go on a job interview, take a test, or run a race. This kind of short-term stress is normal and even useful. It can help you if you need to work hard or react quickly. For example, stress can help you finish an important job on time.  Long-term stress is caused by ongoing stressful situations or events. Examples of long-term stress include long-term health problems, ongoing problems at work, or  conflicts in your family. Long-term stress can harm your health.  How does stress affect your health?  When you are stressed, your body responds as though you are in danger. It makes hormones that speed up your heart, make you breathe faster, and give you a burst of energy. This is called the fight-or-flight stress response. If the stress is over quickly, your body goes back to normal and no harm is done.  But if stress happens too often or lasts too long, it can have bad effects. Long-term stress can make you more likely to get sick, and it can make symptoms of some diseases worse. If you tense up when you are stressed, you may develop neck, shoulder, or low back pain. Stress is linked to high blood pressure and heart disease.  Stress also harms your emotional health. It can make you fortune, tense, or depressed. Your relationships may suffer, and you may not do well at work or school.  What can you do to manage stress?  You can try these things to help manage stress:   Do something active. Exercise or activity can help reduce stress. Walking is a great way to get started. Even everyday activities such as housecleaning or yard work can help.  Try yoga or suzanne chi. These techniques combine exercise and meditation. You may need some training at first to learn them.  Do something you enjoy. For example, listen to music or go to a movie. Practice your hobby or do volunteer work.  Meditate. This can help you relax, because you are not worrying about what happened before or what may happen in the future.  Do guided imagery. Imagine yourself in any setting that helps you feel calm. You can use online videos, books, or a teacher to guide you.  Do breathing exercises. For example:  From a standing position, bend forward from the waist with your knees slightly bent. Let your arms dangle close to the floor.  Breathe in slowly and deeply as you return to a standing position. Roll up slowly and lift your head last.  Hold your  "breath for just a few seconds in the standing position.  Breathe out slowly and bend forward from the waist.  Let your feelings out. Talk, laugh, cry, and express anger when you need to. Talking with supportive friends or family, a counselor, or a valentina leader about your feelings is a healthy way to relieve stress. Avoid discussing your feelings with people who make you feel worse.  Write. It may help to write about things that are bothering you. This helps you find out how much stress you feel and what is causing it. When you know this, you can find better ways to cope.  What can you do to prevent stress?  You might try some of these things to help prevent stress:  Manage your time. This helps you find time to do the things you want and need to do.  Get enough sleep. Your body recovers from the stresses of the day while you are sleeping.  Get support. Your family, friends, and community can make a difference in how you experience stress.  Limit your news feed. Avoid or limit time on social media or news that may make you feel stressed.  Do something active. Exercise or activity can help reduce stress. Walking is a great way to get started.  Where can you learn more?  Go to https://www.[employer].net/patiented  Enter N032 in the search box to learn more about \"Learning About Stress.\"  Current as of: October 24, 2023  Content Version: 14.3    2024 [employer].   Care instructions adapted under license by your healthcare professional. If you have questions about a medical condition or this instruction, always ask your healthcare professional. [employer] disclaims any warranty or liability for your use of this information.    9 Ways to Cut Back on Drinking  Maybe you've found yourself drinking more alcohol than you'd prefer. If you want to cut back, here are some ideas to try.    Think before you drink.  Do you really want a drink, or is it just a habit? If you're used to having a drink at a " "certain time, try doing something else then.     Look for substitutes.  Find some no-alcohol drinks that you enjoy, like flavored seltzer water, tea with honey, or tonic with a slice of lime. Or try alcohol-free beer or \"virgin\" cocktails (without the alcohol).     Drink more water.  Use water to quench your thirst. Drink a glass of water before you have any alcohol. Have another glass along with every drink or between drinks.     Shrink your drink.  For example, have a bottle of beer instead of a pint. Use a smaller glass for wine. Choose drinks with lower alcohol content (ABV%). Or use less liquor and more mixer in cocktails.     Slow down.  It's easy to drink quickly and without thinking about it. Pay attention, and make each drink last longer.     Do the math.  Total up how much you spend on alcohol each month. How much is that a year? If you cut back, what could you do with the money you save?     Take a break.  Choose a day or two each week when you won't drink at all. Notice how you feel on those days, physically and emotionally. How did you sleep? Do you feel better? Over time, add more break days.     Count calories.  Would you like to lose some weight? For some people that's a good motivator for cutting back. Figure out how many calories are in each drink. How many does that add up to in a day? In a week? In a month?     Practice saying no.  Be ready when someone offers you a drink. Try: \"Thanks, I've had enough.\" Or \"Thanks, but I'm cutting back.\" Or \"No, thanks. I feel better when I drink less.\"   Current as of: November 15, 2023  Content Version: 14.3    2024 [employer].   Care instructions adapted under license by your healthcare professional. If you have questions about a medical condition or this instruction, always ask your healthcare professional. [employer] disclaims any warranty or liability for your use of this information.     "

## 2025-02-04 ENCOUNTER — PATIENT OUTREACH (OUTPATIENT)
Dept: CARE COORDINATION | Facility: CLINIC | Age: 30
End: 2025-02-04

## 2025-03-28 ENCOUNTER — OFFICE VISIT (OUTPATIENT)
Dept: BEHAVIORAL HEALTH | Facility: CLINIC | Age: 30
End: 2025-03-28
Attending: NURSE PRACTITIONER
Payer: COMMERCIAL

## 2025-03-28 DIAGNOSIS — F32.1 CURRENT MODERATE EPISODE OF MAJOR DEPRESSIVE DISORDER WITHOUT PRIOR EPISODE (H): ICD-10-CM

## 2025-03-28 DIAGNOSIS — F33.1 MAJOR DEPRESSIVE DISORDER, RECURRENT EPISODE, MODERATE (H): Primary | ICD-10-CM

## 2025-03-28 PROCEDURE — 90832 PSYTX W PT 30 MINUTES: CPT

## 2025-03-28 ASSESSMENT — ANXIETY QUESTIONNAIRES
7. FEELING AFRAID AS IF SOMETHING AWFUL MIGHT HAPPEN: NOT AT ALL
IF YOU CHECKED OFF ANY PROBLEMS ON THIS QUESTIONNAIRE, HOW DIFFICULT HAVE THESE PROBLEMS MADE IT FOR YOU TO DO YOUR WORK, TAKE CARE OF THINGS AT HOME, OR GET ALONG WITH OTHER PEOPLE: SOMEWHAT DIFFICULT
5. BEING SO RESTLESS THAT IT IS HARD TO SIT STILL: SEVERAL DAYS
7. FEELING AFRAID AS IF SOMETHING AWFUL MIGHT HAPPEN: NOT AT ALL
4. TROUBLE RELAXING: SEVERAL DAYS
GAD7 TOTAL SCORE: 7
2. NOT BEING ABLE TO STOP OR CONTROL WORRYING: SEVERAL DAYS
8. IF YOU CHECKED OFF ANY PROBLEMS, HOW DIFFICULT HAVE THESE MADE IT FOR YOU TO DO YOUR WORK, TAKE CARE OF THINGS AT HOME, OR GET ALONG WITH OTHER PEOPLE?: SOMEWHAT DIFFICULT
3. WORRYING TOO MUCH ABOUT DIFFERENT THINGS: SEVERAL DAYS
1. FEELING NERVOUS, ANXIOUS, OR ON EDGE: MORE THAN HALF THE DAYS
6. BECOMING EASILY ANNOYED OR IRRITABLE: SEVERAL DAYS

## 2025-03-28 ASSESSMENT — PATIENT HEALTH QUESTIONNAIRE - PHQ9
SUM OF ALL RESPONSES TO PHQ QUESTIONS 1-9: 8
10. IF YOU CHECKED OFF ANY PROBLEMS, HOW DIFFICULT HAVE THESE PROBLEMS MADE IT FOR YOU TO DO YOUR WORK, TAKE CARE OF THINGS AT HOME, OR GET ALONG WITH OTHER PEOPLE: SOMEWHAT DIFFICULT
SUM OF ALL RESPONSES TO PHQ QUESTIONS 1-9: 8

## 2025-03-28 NOTE — PROGRESS NOTES
[facility] Primary Care: Integrated Behavioral Health    Integrated Behavioral Health   Mental Health & Addiction Services      Progress Note - Initial [facility] Visit     Patient Name: Rylee P Caruso    Date: March 28, 2025  Service Type: Individual   Visit Start Time: 8:35 AM  Visit End Time:   9:00 am    Attendees: Patient   Service Modality: In-person     [facility] Visit Activities (Refresh list every visit): NEW and [facility] Only         DATA:     Interactive Complexity: No   Crisis: No     Assessments completed:     The following assessments were completed by patient for this visit:  PHQ9:       2/3/2025     3:15 PM 3/28/2025     8:14 AM   PHQ-9 SCORE   PHQ-9 Total Score MyChart  8 (Mild depression)   PHQ-9 Total Score 3 8        Patient-reported     GAD7:       3/28/2025     8:15 AM   PATRICIA-7 SCORE   Total Score 7 (mild anxiety)   Total Score 7        Patient-reported     CAGE-AID:       3/28/2025     8:15 AM   CAGE-AID Total Score   Total Score 1    Total Score MyChart 1 (A total score of 2 or greater is considered clinically significant)       Patient-reported            Referral:   Patient was referred to [facility] by [unfilled].    Reason for referral: clarify behavioral health diagnosis.      [facility] introduced self and role. Discussed informed consent and limits to confidentiality.     Presenting Concerns/ Current Stressors:   Pt reports previous discussion with providers around ADD and/or Bipolar symptoms. Pt and therapist discussed process of brief therapy and areas of focus I.e.- coping skills and techniques, and referral process for standard outpatient counseling. Pt and therapist agreed to explore symptoms further.     Therapeutic Interventions:  Motivational Interviewing (MI): Validated patient's thoughts, feelings and experience. Asked open-ended questions to invite patient's self-reflection and self-direction around change and what is important for them in working towards their goals.      Response to treatment interventions:   Patient was receptive to interventions utilized.  Patient was engaged in the therapy process.      Safety Issues and Plan for Safety and Risk Management:     Patient denies a history of suicidal ideation, suicide attempts, self-injurious behavior, homicidal ideation, homicidal behavior, and and other safety concerns   Patient denies current fears or concerns for personal safety.   Patient denies current or recent suicidal ideation or behaviors.   Patient denies current or recent homicidal ideation or behaviors.   Patient denies current or recent self injurious behavior or ideation.   Patient denies other safety concerns.   Recommended that patient call 911 or go to the local ED should there be a change in any of these risk factors   Patient reports there are no firearms in the house.       ASSESSMENT:   Mental Status:     Appearance:   Appropriate    Eye Contact:   Fair    Psychomotor Behavior: Normal    Attitude:   Cooperative    Orientation:   All   Speech Rate / Production: Normal/ Responsive   Volume:   Normal    Mood:    Normal   Affect:    Appropriate    Thought Content:  Clear    Thought Form:  Coherent    Insight:    Fair         Diagnostic Criteria:   Major Depressive Disorder  A) Recurrent episode(s) - symptoms have been present during the same 2-week period and represent a change from previous functioning 5 or more symptoms (required for diagnosis)   - Depressed mood. Note: In children and adolescents, can be irritable mood.     - Diminished interest or pleasure in all, or almost all, activities.    - Decreased sleep.    - Fatigue or loss of energy.    - Feelings of worthlessness or excessive guilt.   B) The symptoms cause clinically significant distress or impairment in social, occupational, or other important areas of functioning  C) The episode is not attributable to the physiological effects of a substance or to another medical condition  D) The occurence of  major depressive episode is not better explained by other thought / psychotic disorders  E) There has never been a manic episode or hypomanic episode        DSM5 Diagnoses: (Sustained by DSM5 Criteria Listed Above)     Diagnoses: 296.32 (F33.1) Major Depressive Disorder, Recurrent Episode, Moderate _ and With anxious distress     Psychosocial / Contextual Factors: Relationship Concerns and Interpersonal Concerns       Collateral Reports Completed:   Routed note to PCP        PLAN: (Homework, other):     1. Patient was provided:  recommendation to schedule follow-up with [facility]     2. Provider recommended the following referrals: continue sessions with [facility], consider long-term counseling.        3. Suicide Risk and Safety Concerns were assessed for Rylee P Caruso    Safety Plan:   Patient denied any current/recent/lifetime history of suicidal ideation and/or behaviors. Recommended that patient call 911 or go to the local ED should there be a change in any of these risk factors       Karolina Nieto [facility]   March 28, 2025